# Patient Record
Sex: FEMALE | Race: WHITE | NOT HISPANIC OR LATINO
[De-identification: names, ages, dates, MRNs, and addresses within clinical notes are randomized per-mention and may not be internally consistent; named-entity substitution may affect disease eponyms.]

---

## 2024-10-04 PROBLEM — Z78.9 OTHER SPECIFIED HEALTH STATUS: Chronic | Status: ACTIVE | Noted: 2024-10-03

## 2024-10-10 ENCOUNTER — APPOINTMENT (OUTPATIENT)
Dept: ANTEPARTUM | Facility: CLINIC | Age: 39
End: 2024-10-10
Payer: COMMERCIAL

## 2024-10-10 ENCOUNTER — ASOB RESULT (OUTPATIENT)
Age: 39
End: 2024-10-10

## 2024-10-10 PROCEDURE — 76819 FETAL BIOPHYS PROFIL W/O NST: CPT

## 2024-10-10 PROCEDURE — 76815 OB US LIMITED FETUS(S): CPT

## 2024-10-10 PROCEDURE — 76820 UMBILICAL ARTERY ECHO: CPT

## 2024-10-15 ENCOUNTER — INPATIENT (INPATIENT)
Facility: HOSPITAL | Age: 39
LOS: 2 days | Discharge: HOME | End: 2024-10-18
Attending: OBSTETRICS & GYNECOLOGY | Admitting: OBSTETRICS & GYNECOLOGY
Payer: COMMERCIAL

## 2024-10-15 VITALS
TEMPERATURE: 98 F | HEART RATE: 100 BPM | RESPIRATION RATE: 17 BRPM | DIASTOLIC BLOOD PRESSURE: 80 MMHG | SYSTOLIC BLOOD PRESSURE: 121 MMHG

## 2024-10-15 DIAGNOSIS — O26.899 OTHER SPECIFIED PREGNANCY RELATED CONDITIONS, UNSPECIFIED TRIMESTER: ICD-10-CM

## 2024-10-15 LAB
ALBUMIN SERPL ELPH-MCNC: 3.9 G/DL — SIGNIFICANT CHANGE UP (ref 3.3–5)
ALP SERPL-CCNC: 206 U/L — HIGH (ref 40–120)
ALT FLD-CCNC: 18 U/L — SIGNIFICANT CHANGE UP (ref 10–45)
AMNISURE ROM (RUPTURE OF MEMBRANES): POSITIVE
ANION GAP SERPL CALC-SCNC: 12 MMOL/L — SIGNIFICANT CHANGE UP (ref 5–17)
APTT BLD: 28.5 SEC — SIGNIFICANT CHANGE UP (ref 24.5–35.6)
AST SERPL-CCNC: 24 U/L — SIGNIFICANT CHANGE UP (ref 10–40)
BASOPHILS # BLD AUTO: 0.04 K/UL — SIGNIFICANT CHANGE UP (ref 0–0.2)
BASOPHILS NFR BLD AUTO: 0.3 % — SIGNIFICANT CHANGE UP (ref 0–2)
BILIRUB SERPL-MCNC: 0.6 MG/DL — SIGNIFICANT CHANGE UP (ref 0.2–1.2)
BLD GP AB SCN SERPL QL: NEGATIVE — SIGNIFICANT CHANGE UP
BUN SERPL-MCNC: 8 MG/DL — SIGNIFICANT CHANGE UP (ref 7–23)
CALCIUM SERPL-MCNC: 9.2 MG/DL — SIGNIFICANT CHANGE UP (ref 8.4–10.5)
CHLORIDE SERPL-SCNC: 104 MMOL/L — SIGNIFICANT CHANGE UP (ref 96–108)
CO2 SERPL-SCNC: 21 MMOL/L — LOW (ref 22–31)
CREAT SERPL-MCNC: 0.71 MG/DL — SIGNIFICANT CHANGE UP (ref 0.5–1.3)
EGFR: 112 ML/MIN/1.73M2 — SIGNIFICANT CHANGE UP
EOSINOPHIL # BLD AUTO: 0.08 K/UL — SIGNIFICANT CHANGE UP (ref 0–0.5)
EOSINOPHIL NFR BLD AUTO: 0.7 % — SIGNIFICANT CHANGE UP (ref 0–6)
FIBRINOGEN PPP-MCNC: 550 MG/DL — HIGH (ref 200–445)
GLUCOSE SERPL-MCNC: 55 MG/DL — LOW (ref 70–99)
HCT VFR BLD CALC: 39.3 % — SIGNIFICANT CHANGE UP (ref 34.5–45)
HGB BLD-MCNC: 14 G/DL — SIGNIFICANT CHANGE UP (ref 11.5–15.5)
IMM GRANULOCYTES NFR BLD AUTO: 0.5 % — SIGNIFICANT CHANGE UP (ref 0–0.9)
INR BLD: 0.83 — LOW (ref 0.85–1.16)
LDH SERPL L TO P-CCNC: 201 U/L — SIGNIFICANT CHANGE UP (ref 50–242)
LYMPHOCYTES # BLD AUTO: 1.88 K/UL — SIGNIFICANT CHANGE UP (ref 1–3.3)
LYMPHOCYTES # BLD AUTO: 15.6 % — SIGNIFICANT CHANGE UP (ref 13–44)
MCHC RBC-ENTMCNC: 34.1 PG — HIGH (ref 27–34)
MCHC RBC-ENTMCNC: 35.6 GM/DL — SIGNIFICANT CHANGE UP (ref 32–36)
MCV RBC AUTO: 95.9 FL — SIGNIFICANT CHANGE UP (ref 80–100)
MONOCYTES # BLD AUTO: 0.79 K/UL — SIGNIFICANT CHANGE UP (ref 0–0.9)
MONOCYTES NFR BLD AUTO: 6.6 % — SIGNIFICANT CHANGE UP (ref 2–14)
NEUTROPHILS # BLD AUTO: 9.17 K/UL — HIGH (ref 1.8–7.4)
NEUTROPHILS NFR BLD AUTO: 76.3 % — SIGNIFICANT CHANGE UP (ref 43–77)
NRBC # BLD: 0 /100 WBCS — SIGNIFICANT CHANGE UP (ref 0–0)
PLATELET # BLD AUTO: 253 K/UL — SIGNIFICANT CHANGE UP (ref 150–400)
POTASSIUM SERPL-MCNC: 3.7 MMOL/L — SIGNIFICANT CHANGE UP (ref 3.5–5.3)
POTASSIUM SERPL-SCNC: 3.7 MMOL/L — SIGNIFICANT CHANGE UP (ref 3.5–5.3)
PROT SERPL-MCNC: 6.8 G/DL — SIGNIFICANT CHANGE UP (ref 6–8.3)
PROTHROM AB SERPL-ACNC: 9.6 SEC — LOW (ref 9.9–13.4)
RBC # BLD: 4.1 M/UL — SIGNIFICANT CHANGE UP (ref 3.8–5.2)
RBC # FLD: 13 % — SIGNIFICANT CHANGE UP (ref 10.3–14.5)
RH IG SCN BLD-IMP: POSITIVE — SIGNIFICANT CHANGE UP
RH IG SCN BLD-IMP: POSITIVE — SIGNIFICANT CHANGE UP
SODIUM SERPL-SCNC: 137 MMOL/L — SIGNIFICANT CHANGE UP (ref 135–145)
URATE SERPL-MCNC: 4.8 MG/DL — SIGNIFICANT CHANGE UP (ref 2.5–7)
WBC # BLD: 12.02 K/UL — HIGH (ref 3.8–10.5)
WBC # FLD AUTO: 12.02 K/UL — HIGH (ref 3.8–10.5)

## 2024-10-15 RX ORDER — SODIUM CITRATE AND CITRIC ACID MONOHYDRATE 334; 500 MG/5ML; MG/5ML
15 SOLUTION ORAL EVERY 6 HOURS
Refills: 0 | Status: DISCONTINUED | OUTPATIENT
Start: 2024-10-15 | End: 2024-10-16

## 2024-10-15 RX ORDER — FAMOTIDINE 40 MG
20 TABLET ORAL ONCE
Refills: 0 | Status: COMPLETED | OUTPATIENT
Start: 2024-10-15 | End: 2024-10-15

## 2024-10-15 RX ORDER — SODIUM CHLORIDE IRRIG SOLUTION 0.9 %
1000 SOLUTION, IRRIGATION IRRIGATION
Refills: 0 | Status: DISCONTINUED | OUTPATIENT
Start: 2024-10-15 | End: 2024-10-16

## 2024-10-15 RX ORDER — DINOPROSTONE 10 MG/241MG
10 INSERT VAGINAL ONCE
Refills: 0 | Status: COMPLETED | OUTPATIENT
Start: 2024-10-15 | End: 2024-10-15

## 2024-10-15 RX ORDER — CHLORHEXIDINE GLUCONATE ORAL RINSE 1.2 MG/ML
1 SOLUTION DENTAL DAILY
Refills: 0 | Status: DISCONTINUED | OUTPATIENT
Start: 2024-10-15 | End: 2024-10-16

## 2024-10-15 RX ORDER — PANTOPRAZOLE SODIUM 40 MG/1
40 TABLET, DELAYED RELEASE ORAL DAILY
Refills: 0 | Status: DISCONTINUED | OUTPATIENT
Start: 2024-10-15 | End: 2024-10-18

## 2024-10-15 RX ORDER — HEPARIN SOD,PORK IN 0.45% NACL 5K/1000 ML
250 INTRAVENOUS SOLUTION INTRAVENOUS
Refills: 0 | Status: DISCONTINUED | OUTPATIENT
Start: 2024-10-15 | End: 2024-10-15

## 2024-10-15 RX ORDER — OXYTOCIN/RINGER'S LACTATE 20/500ML
167 PLASTIC BAG, INJECTION (ML) INTRAVENOUS
Qty: 30 | Refills: 0 | Status: DISCONTINUED | OUTPATIENT
Start: 2024-10-15 | End: 2024-10-16

## 2024-10-15 RX ADMIN — Medication 250 MILLILITER(S): at 20:01

## 2024-10-15 RX ADMIN — PANTOPRAZOLE SODIUM 40 MILLIGRAM(S): 40 TABLET, DELAYED RELEASE ORAL at 17:22

## 2024-10-15 RX ADMIN — Medication 125 MILLILITER(S): at 18:48

## 2024-10-15 NOTE — OB PROVIDER LABOR PROGRESS NOTE - NS_SUBJECTIVE/OBJECTIVE_OBGYN_ALL_OB_FT
The patient had an episode of bloody emesis.   About 10-15 cc of red blood.   She drank a shake earlier, no red fruits in it. This is the first time that she is having bloody emesis.  She is endorsing multiple episodes of vomiting during pregnancy, last one was 3 weeks ago. She also endorses severe heartburn during pregnancy.   She currently denies pain (except of contraction pain)  Full labs drawn   Hb 14.0  Will continue to monitor closely.

## 2024-10-15 NOTE — OB PROVIDER LABOR PROGRESS NOTE - NS_SUBJECTIVE/OBJECTIVE_OBGYN_ALL_OB_FT
Pt seen at bedside. Had episode of hematoemesis. Reports severe heart burn during pregnancy. No pain or nausea now. Denies light headedness

## 2024-10-15 NOTE — OB PROVIDER LABOR PROGRESS NOTE - NS_SUBJECTIVE/OBJECTIVE_OBGYN_ALL_OB_FT
Night team assuming care at this time. EFM and labor plan reviewed. Last VE 4/70/-2. Patient repositioning for epidural.

## 2024-10-15 NOTE — PRE-ANESTHESIA EVALUATION ADULT - NSANTHPEFT_GEN_ALL_CORE
Constitutional: Alert and in no acute distress.  Mouth: Mouth opening within normal limits.   Neck: The appearance of the neck was normal, no neck mass was observed. Thyromental distance within normal limits. Range of motion of neck is within normal limits.  Respiratory: Unlabored breathing.  Cardiovascular:  Rate and rhythm evaluated.  Neurological: No focal deficit, moves all extremities.  Psychiatric: Oriented to person, place, and time, insight and judgment were intact and the affect was normal.  Exercise Tolerance: MET>4.

## 2024-10-15 NOTE — OB RN PATIENT PROFILE - AS SC BRADEN MOBILITY
(4) no limitation

## 2024-10-15 NOTE — OB PROVIDER LABOR PROGRESS NOTE - NS_OBIHIFHRDETAILS_OBGYN_ALL_OB_FT
FHT Cat 2, FHR bpm, moderate variability, + accels, + prolonged 4 minute post-epidural deceleration, improved with interventions described above. Moderate variability overall reassuring. FHT Cat 2,  bpm, moderate variability, + accels, + prolonged 4 minute post-epidural deceleration, improved with interventions described above. Moderate variability overall reassuring.

## 2024-10-15 NOTE — OB PROVIDER LABOR PROGRESS NOTE - NS_OBIHICONTRACTIONPATTERNDETAILS_OBGYN_ALL_OB_FT
contractions q 2-5min
Angelica q2-3 minutes on toco.
ctx q 2-3 min
Angelica q2 minutes on toco.
ctx q2 minutes
regular q3 min

## 2024-10-15 NOTE — OB PROVIDER H&P - HISTORY OF PRESENT ILLNESS
39y  at 39+4 presenting for wetness in her underwear. Yesterday she started to have mild irregular ctx 2/10 pain and she passed her mucus plag. She woke up with her underwear wet, she describes it as thick discharge, clear, she is no leaking since then.   +FM. Denies VB    Ante: natural pregnancy, NIPT and anatomy scan wnl, passed GCT  EFW 3600g GBS negative  Denies elevated BPs in pregnancy    ObHx: G1 - current  GynHx: abnormal paps in the past, most recent one wnl  PMH: denies  PSH: deneis  Meds: PNV  Allergy: PCN and sulfa - hives    T(C): 36.5 (10-15-24 @ 11:14), Max: 36.5 (10-15-24 @ 11:14)  HR: 100 (10-15-24 @ 11:14) (100 - 100)  BP: 121/80 (10-15-24 @ 11:14) (121/80 - 121/80)  RR: 17 (10-15-24 @ 11:14) (17 - 17)  SpO2: --    PE:   NAD, A+O*3  Comfortable on RA  Abdomen gravid soft nontender  Extremities no edema/calf tenderness  VE: FT/long  TAUS: Cephalic, anterior placenta, BPP 8/8, SANTIAGO 10  FHT: baseline 130bpm, moderate variability, accels, no decels  [place]: irregular ctx, uterine irritability  Spec: cervix visualized closed, no pooling with valsalva, nitrazine negative  Amnisure taken  Ferning negative    39y  at 39+4 presenting for r/o rupture of membranes, amnisure is positive, patient will be admitted for term induction.     D/w Dr. Haro

## 2024-10-15 NOTE — OB RN PATIENT PROFILE - PRO MENTAL HEALTH SX RECENT
none

## 2024-10-15 NOTE — OB PROVIDER TRIAGE NOTE - HISTORY OF PRESENT ILLNESS
39y  at 39+4 presenting for wetness in her underwear. Yesterday she started to have mild irregular ctx 2/10 pain and she passed her mucus plag. She woke up with her underwear wet, she describes it as thick discharge, clear, she is no leaking since then.   +FM. Denies VB    Ante: natural pregnancy, NIPT and anatomy scan wnl, passed GCT  EFW 3600g GBS negative  Denies elevated BPs in pregnancy    ObHx: G1 - current  GynHx: abnormal paps in the past, most recent one wnl  PMH: denies  PSH: deneis  Meds: PNV  Allergy: PCN and sulfa - hives    T(C): 36.5 (10-15-24 @ 11:14), Max: 36.5 (10-15-24 @ 11:14)  HR: 100 (10-15-24 @ 11:14) (100 - 100)  BP: 121/80 (10-15-24 @ 11:14) (121/80 - 121/80)  RR: 17 (10-15-24 @ 11:14) (17 - 17)  SpO2: --    PE:   NAD, A+O*3  Comfortable on RA  Abdomen gravid soft nontender  Extremities no edema/calf tenderness  VE:   TAUS:   FHT: baseline  [place]:       39y yo at    presenting for  - Consented  - NPO and IVF  - Prenatals reviewed, f/u routine labs  - FHT reactive and reassuring   39y  at 39+4 presenting for wetness in her underwear. Yesterday she started to have mild irregular ctx 2/10 pain and she passed her mucus plag. She woke up with her underwear wet, she describes it as thick discharge, clear, she is no leaking since then.   +FM. Denies VB    Ante: natural pregnancy, NIPT and anatomy scan wnl, passed GCT  EFW 3600g GBS negative  Denies elevated BPs in pregnancy    ObHx: G1 - current  GynHx: abnormal paps in the past, most recent one wnl  PMH: denies  PSH: deneis  Meds: PNV  Allergy: PCN and sulfa - hives    T(C): 36.5 (10-15-24 @ 11:14), Max: 36.5 (10-15-24 @ 11:14)  HR: 100 (10-15-24 @ 11:14) (100 - 100)  BP: 121/80 (10-15-24 @ 11:14) (121/80 - 121/80)  RR: 17 (10-15-24 @ 11:14) (17 - 17)  SpO2: --    PE:   NAD, A+O*3  Comfortable on RA  Abdomen gravid soft nontender  Extremities no edema/calf tenderness  VE: FT/long  TAUS: Cephalic, anterior placenta, BPP 8/8, SANTIAGO 10  FHT: baseline 130bpm, moderate variability, accels, no decels  [place]: irregular ctx, uterine irritability  Spec: cervix visualized closed, no pooling with valsalva, nitrazine negative  Amnisure taken  Ferning negative    39y  at 39+4 presenting for r/o rupture of membranes  - Patient had one episode of wetness which she is describes " like when you stand up after having sex and you have a little gush", she is not leaking since  - speculum exam is negative for pooling with valsalva   - Nitrazine negative  - Ferning negative  - SANTIAGO 10  Low concern for rupture. Fetal status is reassuring both on NST and BPP, patient is not painfuly laurent and is not in labor, cervix is closed and long.  The patient is scheduled for an induction on 10/17, she received strict return percations including VB/LOF, painful regular ctx, decreased fetal movements.   D/w Dr. Haro   39y  at 39+4 presenting for wetness in her underwear. Yesterday she started to have mild irregular ctx 2/10 pain and she passed her mucus plag. She woke up with her underwear wet, she describes it as thick discharge, clear, she is no leaking since then.   +FM. Denies VB    Ante: natural pregnancy, NIPT and anatomy scan wnl, passed GCT  EFW 3600g GBS negative  Denies elevated BPs in pregnancy    ObHx: G1 - current  GynHx: abnormal paps in the past, most recent one wnl  PMH: denies  PSH: deneis  Meds: PNV  Allergy: PCN and sulfa - hives    T(C): 36.5 (10-15-24 @ 11:14), Max: 36.5 (10-15-24 @ 11:14)  HR: 100 (10-15-24 @ 11:14) (100 - 100)  BP: 121/80 (10-15-24 @ 11:14) (121/80 - 121/80)  RR: 17 (10-15-24 @ 11:14) (17 - 17)  SpO2: --    PE:   NAD, A+O*3  Comfortable on RA  Abdomen gravid soft nontender  Extremities no edema/calf tenderness  VE: FT/long  TAUS: Cephalic, anterior placenta, BPP 8/8, SANTIAGO 10  FHT: baseline 130bpm, moderate variability, accels, no decels  [place]: irregular ctx, uterine irritability  Spec: cervix visualized closed, no pooling with valsalva, nitrazine negative  Amnisure taken  Ferning negative    39y  at 39+4 presenting for r/o rupture of membranes, amnisure is positive, patient will be admitted for term induction.     D/w Dr. Haro

## 2024-10-15 NOTE — OB PROVIDER LABOR PROGRESS NOTE - ASSESSMENT
38yo P1 now in second stage of labor, AFVSS, plan to start pushing.
Cervical santa balloon in situ. Continue to monitor contractions.
IOL for suspected prom, suspected lga, cat 1 tracing  will check cbc,coags  pantoprazole ordered  plan to start pitocin
- Epidural in situ  - Continue expectant management  - Will continue to monitor    Dr. Lanrdum on
- Plan for epidural placement then SVE  - Will continue to monitor
Continue to monitor  Tracing Cat I

## 2024-10-15 NOTE — OB PROVIDER H&P - NSLOWPPHRISK_OBGYN_A_OB
No previous uterine incision/[place] Pregnancy/Less than or equal to 4 previous vaginal births/No known bleeding disorder/No history of postpartum hemorrhage/No other PPH risks indicated

## 2024-10-15 NOTE — OB PROVIDER H&P - BLOOD TRANSFUSION, PREVIOUS, PROFILE
Attempted to call patient - voicemail not set up at this time    Nothing further, her mammogram was good just noting that her breast tissue is dense.    no

## 2024-10-15 NOTE — PRE-ANESTHESIA EVALUATION ADULT - NSANTHOSAYNRD_GEN_A_CORE
No. MONTSE screening performed.  STOP BANG Legend: 0-2 = LOW Risk; 3-4 = INTERMEDIATE Risk; 5-8 = HIGH Risk

## 2024-10-15 NOTE — OB PROVIDER LABOR PROGRESS NOTE - NS_SUBJECTIVE/OBJECTIVE_OBGYN_ALL_OB_FT
EFM reviewed  Patient seen at the bedside as reporting sensation that balloon has come out  Balloon out  Clear amniotic fluid noted on exam  SVE: 4/70/-2

## 2024-10-15 NOTE — OB PROVIDER LABOR PROGRESS NOTE - NS_SUBJECTIVE/OBJECTIVE_OBGYN_ALL_OB_FT
Cook balloon placed at 15:45.   VE: 1/80/-2  Patient is laurent every 2-3 min  FHT: baseline 140  bpm, moderate variability, +accels, -decels cat1  [place]: 2-3

## 2024-10-15 NOTE — OB PROVIDER LABOR PROGRESS NOTE - NS_SUBJECTIVE/OBJECTIVE_OBGYN_ALL_OB_FT
Note delayed 2/2 patient care. Patient seen a bedside for a 4 minute late deceleration with a shikha of 70 bpm after epidural placement. Patient repositioned to her left and right side, IV fluids bolused, BP 76/37, and ephedrine was given by anesthesia with a return of the FHR to 120 bpm. Repeat /35 and 128/78.  Patient also vomiting at this time with continued hematemesis, now "coffee ground", not bright red, improved with normotensive BP. SVE 5/80/-2. Note delayed 2/2 patient care. Patient seen a bedside for a 4 minute late deceleration with a shikha of 70 bpm after epidural placement. Patient repositioned to her left and right side, IV fluids bolused, BP 76/37, and ephedrine was given by anesthesia with a return of the FHR to 120 bpm, then to 130 bpm. Repeat /35 and 128/78. Patient also vomiting at this time with continued hematemesis, now "coffee ground", not bright red, nausea improved with normotensive BP. SVE 5/80/-2.

## 2024-10-15 NOTE — OB PROVIDER LABOR PROGRESS NOTE - NS_SUBJECTIVE/OBJECTIVE_OBGYN_ALL_OB_FT
120 mod tierney +Accel -decel   ctx q3 min   cat I reassuring fetal a/b status  cw expectant management  reexamine around midnight

## 2024-10-16 LAB
HCT VFR BLD CALC: 31.4 % — LOW (ref 34.5–45)
HGB BLD-MCNC: 10.8 G/DL — LOW (ref 11.5–15.5)
MCHC RBC-ENTMCNC: 33.8 PG — SIGNIFICANT CHANGE UP (ref 27–34)
MCHC RBC-ENTMCNC: 34.4 GM/DL — SIGNIFICANT CHANGE UP (ref 32–36)
MCV RBC AUTO: 98.1 FL — SIGNIFICANT CHANGE UP (ref 80–100)
NRBC # BLD: 0 /100 WBCS — SIGNIFICANT CHANGE UP (ref 0–0)
PLATELET # BLD AUTO: 213 K/UL — SIGNIFICANT CHANGE UP (ref 150–400)
RBC # BLD: 3.2 M/UL — LOW (ref 3.8–5.2)
RBC # FLD: 13 % — SIGNIFICANT CHANGE UP (ref 10.3–14.5)
T PALLIDUM AB TITR SER: NEGATIVE — SIGNIFICANT CHANGE UP
WBC # BLD: 18.15 K/UL — HIGH (ref 3.8–10.5)
WBC # FLD AUTO: 18.15 K/UL — HIGH (ref 3.8–10.5)

## 2024-10-16 PROCEDURE — 99222 1ST HOSP IP/OBS MODERATE 55: CPT | Mod: GC

## 2024-10-16 RX ORDER — ANTI-ITCH CREAM 1 G/100G
1 OINTMENT TOPICAL EVERY 6 HOURS
Refills: 0 | Status: DISCONTINUED | OUTPATIENT
Start: 2024-10-16 | End: 2024-10-18

## 2024-10-16 RX ORDER — KETOROLAC TROMETHAMINE 10 MG/1
30 TABLET, FILM COATED ORAL ONCE
Refills: 0 | Status: DISCONTINUED | OUTPATIENT
Start: 2024-10-16 | End: 2024-10-16

## 2024-10-16 RX ORDER — PRENATAL VIT,CAL 76/IRON/FOLIC 29 MG-1 MG
1 TABLET ORAL DAILY
Refills: 0 | Status: DISCONTINUED | OUTPATIENT
Start: 2024-10-16 | End: 2024-10-18

## 2024-10-16 RX ORDER — SODIUM CHLORIDE 0.9 % (FLUSH) 0.9 %
3 SYRINGE (ML) INJECTION EVERY 8 HOURS
Refills: 0 | Status: DISCONTINUED | OUTPATIENT
Start: 2024-10-16 | End: 2024-10-18

## 2024-10-16 RX ORDER — OXYCODONE HYDROCHLORIDE 30 MG/1
5 TABLET, FILM COATED, EXTENDED RELEASE ORAL
Refills: 0 | Status: DISCONTINUED | OUTPATIENT
Start: 2024-10-16 | End: 2024-10-18

## 2024-10-16 RX ORDER — TETANUS TOXOID, REDUCED DIPHTHERIA TOXOID AND ACELLULAR PERTUSSIS VACCINE, ADSORBED 5; 2.5; 8; 8; 2.5 [IU]/.5ML; [IU]/.5ML; UG/.5ML; UG/.5ML; UG/.5ML
0.5 SUSPENSION INTRAMUSCULAR ONCE
Refills: 0 | Status: DISCONTINUED | OUTPATIENT
Start: 2024-10-16 | End: 2024-10-18

## 2024-10-16 RX ORDER — DIBUCAINE 1 %
1 OINTMENT (GRAM) TOPICAL EVERY 6 HOURS
Refills: 0 | Status: DISCONTINUED | OUTPATIENT
Start: 2024-10-16 | End: 2024-10-18

## 2024-10-16 RX ORDER — SOAP/LANOLIN
1 BAR TOPICAL EVERY 4 HOURS
Refills: 0 | Status: DISCONTINUED | OUTPATIENT
Start: 2024-10-16 | End: 2024-10-18

## 2024-10-16 RX ORDER — PRAMOXINE HYDROCHLORIDE 10 MG/ML
1 LOTION TOPICAL EVERY 4 HOURS
Refills: 0 | Status: DISCONTINUED | OUTPATIENT
Start: 2024-10-16 | End: 2024-10-18

## 2024-10-16 RX ORDER — PANTOPRAZOLE SODIUM 40 MG/1
40 TABLET, DELAYED RELEASE ORAL
Refills: 0 | Status: DISCONTINUED | OUTPATIENT
Start: 2024-10-16 | End: 2024-10-16

## 2024-10-16 RX ORDER — DIPHENHYDRAMINE HCL 12.5MG/5ML
25 LIQUID (ML) ORAL EVERY 6 HOURS
Refills: 0 | Status: DISCONTINUED | OUTPATIENT
Start: 2024-10-16 | End: 2024-10-18

## 2024-10-16 RX ORDER — MAGNESIUM HYDROXIDE 400 MG/5ML
30 SUSPENSION, ORAL (FINAL DOSE FORM) ORAL
Refills: 0 | Status: DISCONTINUED | OUTPATIENT
Start: 2024-10-16 | End: 2024-10-18

## 2024-10-16 RX ORDER — OXYCODONE HYDROCHLORIDE 30 MG/1
5 TABLET, FILM COATED, EXTENDED RELEASE ORAL ONCE
Refills: 0 | Status: DISCONTINUED | OUTPATIENT
Start: 2024-10-16 | End: 2024-10-18

## 2024-10-16 RX ORDER — OXYTOCIN/RINGER'S LACTATE 20/500ML
167 PLASTIC BAG, INJECTION (ML) INTRAVENOUS
Qty: 30 | Refills: 0 | Status: DISCONTINUED | OUTPATIENT
Start: 2024-10-16 | End: 2024-10-18

## 2024-10-16 RX ORDER — ACETAMINOPHEN 325 MG
975 TABLET ORAL
Refills: 0 | Status: DISCONTINUED | OUTPATIENT
Start: 2024-10-16 | End: 2024-10-18

## 2024-10-16 RX ADMIN — Medication 975 MILLIGRAM(S): at 20:30

## 2024-10-16 RX ADMIN — Medication 975 MILLIGRAM(S): at 15:09

## 2024-10-16 RX ADMIN — Medication 600 MILLIGRAM(S): at 06:50

## 2024-10-16 RX ADMIN — KETOROLAC TROMETHAMINE 30 MILLIGRAM(S): 10 TABLET, FILM COATED ORAL at 04:00

## 2024-10-16 RX ADMIN — Medication 1 TABLET(S): at 11:22

## 2024-10-16 RX ADMIN — Medication 975 MILLIGRAM(S): at 20:49

## 2024-10-16 RX ADMIN — Medication 600 MILLIGRAM(S): at 06:40

## 2024-10-16 RX ADMIN — Medication 600 MILLIGRAM(S): at 11:22

## 2024-10-16 RX ADMIN — Medication 3 MILLILITER(S): at 06:50

## 2024-10-16 RX ADMIN — Medication 975 MILLIGRAM(S): at 08:53

## 2024-10-16 RX ADMIN — KETOROLAC TROMETHAMINE 30 MILLIGRAM(S): 10 TABLET, FILM COATED ORAL at 02:30

## 2024-10-16 RX ADMIN — Medication 600 MILLIGRAM(S): at 18:05

## 2024-10-16 NOTE — PROGRESS NOTE ADULT - ASSESSMENT
40yo P1 PPD0 s/p  c/b 3a laceration, doing well this morning. In setting of episode of hematemesis during labor, will f/u am cbc to evaluate for ongoing bleeding and consult GI to expedite outpatient follow up. No further episodes of hematemesis since delivery and no ongoing symptoms of reflux. AFVSS. Discussed office appointments at 2w and 6w postpartum. Reviewed bleeding and infection precautions, all questions answered.     Regular diet  DVT ppx: ambulating  Continue tylenol/ibuprofen for pain control, dilaudid prn  Pepcid prn  Routine perineal care, icing, stool softener   No circumcision  Discharge home PPD1/2

## 2024-10-16 NOTE — LACTATION INITIAL EVALUATION - LACTATION INTERVENTIONS
initiate/review safe skin-to-skin/initiate/review techniques for position and latch/post discharge community resources provided/initiate/review breast massage/compression/reviewed components of an effective feeding and at least 8 effective feedings per day required/reviewed importance of monitoring infant diapers, the breastfeeding log, and minimum output each day/reviewed feeding on demand/by cue at least 8 times a day

## 2024-10-16 NOTE — LACTATION INITIAL EVALUATION - NS LACT CON REASON FOR REQ
Minerva Romero is a 39 year old  parent of a 39.4 week infant. LC to bedside to discuss progress of breastfeeding and provide support on PPD0. Patient states that she plans to breastfeed. She denies any chronic medical conditions or history of breast/chest surgery. On assessment, breasts are pendulous, soft, symmetric. Nipples intact and everted, +colostrum. LC assisted with hand placement in the football position. Baby latched with wide gape, flanged lips, and bursts of nutritive suckling. Patient endorses a comfortable latch.   PLAN: Provide as much skin to skin as safely able, continue nursing on demand at least Q2-3h, monitor diaper output, follow up with pediatrician, outpatient IBCLC PRN.   EDUCATION:  *Normal  feeding/behavior patterns   *Infant feeding cues and strategies to feed a sleepy baby   *Stimulate breasts & feed baby Q2-3h, keys to an adequate supply   *Properties of a proper latch and positioning to achieve a deep and effective latch  *Breastfeeding positions   *Signs of milk transfer at the breast   *Benefits/methods for hand expression   *Signs of satiety & adequate  intake/output such as wet/dirty diapers and weight.   Patient verbalized understanding of education and how/when to follow up. All questions answered at this time, and LC available via RN.

## 2024-10-16 NOTE — OB PROVIDER DELIVERY SUMMARY - NSPROVIDERDELIVERYNOTE_OBGYN_ALL_OB_FT
Uncomplicated  of vigorous male . Delayed cord clamping. 3A laceration repaired with vicryl and chromic. Pediatrician present at delivery for cat 2 tracing. Patient and  doing well.

## 2024-10-16 NOTE — CONSULT NOTE ADULT - SUBJECTIVE AND OBJECTIVE BOX
GASTROENTEROLOGY CONSULT NOTE  HPI: 39y  at 39+4 presenting for wetness in her underwear. Yesterday she started to have mild irregular ctx 2/10 pain and she passed her mucus plag. She woke up with her underwear wet, she describes it as thick discharge, clear, she is no leaking since then. S/p vaginal delivery on 10/16/24 and GI was consulted for an episode of hematemesis:    Patient had frequent GERD symptoms during pregnancy but managed w/out medications. During labor she had a few episodes of vomiting w/ the last episode of emesis w/ bright red blood mixed in. No further episodes of vomiting since delivery. GERD symptoms also resolved since delivery.     Allergies    sulfa drugs (Rash)  penicillin (Rash)    Intolerances      Home Medications:    MEDICATIONS:  MEDICATIONS  (STANDING):  acetaminophen     Tablet .. 975 milliGRAM(s) Oral <User Schedule>  diphtheria/tetanus/pertussis (acellular) Vaccine (Adacel) 0.5 milliLiter(s) IntraMuscular once  famotidine Injectable 20 milliGRAM(s) IV Push once  fentanyl (2 MICROgram(s)/mL) + bupivacaine 0.0625%  in 0.9% Sodium Chloride PCEA 250 milliLiter(s) Epidural PCA Continuous  ibuprofen  Tablet. 600 milliGRAM(s) Oral every 6 hours  oxytocin Infusion 167 milliUNIT(s)/Min (167 mL/Hr) IV Continuous <Continuous>  pantoprazole  Injectable 40 milliGRAM(s) IV Push daily  polyethylene glycol 3350 17 Gram(s) Oral daily  prenatal multivitamin 1 Tablet(s) Oral daily  sodium chloride 0.9% lock flush 3 milliLiter(s) IV Push every 8 hours    MEDICATIONS  (PRN):  benzocaine 20%/menthol 0.5% Spray 1 Spray(s) Topical every 6 hours PRN for Perineal discomfort  dibucaine 1% Ointment 1 Application(s) Topical every 6 hours PRN Perineal discomfort  diphenhydrAMINE 25 milliGRAM(s) Oral every 6 hours PRN Pruritus  hydrocortisone 1% Cream 1 Application(s) Topical every 6 hours PRN Moderate Pain (4-6)  lanolin Ointment 1 Application(s) Topical every 6 hours PRN nipple soreness  magnesium hydroxide Suspension 30 milliLiter(s) Oral two times a day PRN Constipation  oxyCODONE    IR 5 milliGRAM(s) Oral every 3 hours PRN Moderate to Severe Pain (4-10)  oxyCODONE    IR 5 milliGRAM(s) Oral once PRN Moderate to Severe Pain (4-10)  pramoxine 1%/zinc 5% Cream 1 Application(s) Topical every 4 hours PRN Moderate Pain (4-6)  simethicone 80 milliGRAM(s) Chew every 4 hours PRN Gas  witch hazel Pads 1 Application(s) Topical every 4 hours PRN Perineal discomfort    PAST MEDICAL & SURGICAL HISTORY:  No pertinent past medical history      No significant past surgical history        FAMILY HISTORY:    SOCIAL HISTORY:  Tobacco: [ ] Current, [ ] Former, [ ] Never; Pack Years:  Alcohol:  Illicit Drugs:    REVIEW OF SYSTEMS:  All other 10 review of systems is negative unless indicated above.    Vital Signs Last 24 Hrs  T(C): 36.9 (16 Oct 2024 14:01), Max: 37.1 (16 Oct 2024 04:11)  T(F): 98.4 (16 Oct 2024 14:), Max: 98.7 (16 Oct 2024 04:11)  HR: 65 (16 Oct 2024 14:01) (63 - 97)  BP: 108/66 (16 Oct 2024 14:01) (103/65 - 135/70)  BP(mean): --  RR: 18 (16 Oct 2024 14:) (16 - 18)  SpO2: 96% (16 Oct 2024 14:) (96% - 100%)    Parameters below as of 16 Oct 2024 14:01  Patient On (Oxygen Delivery Method): room air        10-15 @ 07:01  -  10-16 @ 07:00  --------------------------------------------------------  IN: 3500 mL / OUT: 2400 mL / NET: 1100 mL        PHYSICAL EXAM:    General: lying in bed, in no acute distress  HEENT: Neck supple, mmm, no jvd  Lungs: Normal respiratory effort, no intercostal retractions  Cardiovascular: regular rate  Abdomen: Soft, non-tender non-distended; No rebound or guarding  Neurological: CASSIDY, speech fluent  Skin: Warm and dry. No obvious rash    LABS:                        10.8   18.15 )-----------( 213      ( 16 Oct 2024 11:50 )             31.4     10-15    137  |  104  |  8   ----------------------------<  55[L]  3.7   |  21[L]  |  0.71    Ca    9.2      15 Oct 2024 16:35    TPro  6.8  /  Alb  3.9  /  TBili  0.6  /  DBili  x   /  AST  24  /  ALT  18  /  AlkPhos  206[H]  10-15        PT/INR - ( 15 Oct 2024 16:35 )   PT: 9.6 sec;   INR: 0.83          PTT - ( 15 Oct 2024 16:35 )  PTT:28.5 sec    RADIOLOGY & ADDITIONAL STUDIES:     Reviewed

## 2024-10-16 NOTE — OB RN DELIVERY SUMMARY - NSSELHIDDEN_OBGYN_ALL_OB_FT
[NS_DeliveryAttending1_OBGYN_ALL_OB_FT:[id number]==],[NS_DeliveryRN_OBGYN_ALL_OB_FT:[id number]=]

## 2024-10-16 NOTE — OB NEONATOLOGY/PEDIATRICIAN DELIVERY SUMMARY - NSPEDSNEONOTESA_OBGYN_ALL_OB_FT
NICU called to delivery for cat II. Baby emerged with good tone, crying, vigorous. DCC x 30 secs. Infant placed on open warmer, dried, suctioned, stimulated. PE notable for caput otherwise WNL. Cleared for WBN, at this time.

## 2024-10-16 NOTE — CONSULT NOTE ADULT - ASSESSMENT
38 yo female s/p  who had one episode of hematemesis during labor for which GI was consulted:    #Hematemesis  - One episode of hematemesis during labor following episode of vomiting. Also w/ frequent reflux symptoms during pregnancy which are now resolved. Hematemesis most likely 2/2 Nell Esteban tear vs from esophagitis  - No plan for endoscopic evaluation at this time  - Trend CBC, maintain active T&S  - Omeprazole 40 mg daily for 2 weeks  - Outpatient GI f/u  - GI will sign off at this time. Please re-consult if patient's clinical status changes or you have further questions    Case discussed w/ GI attending Dr. Kamaljit Johnson MD  PGY-4 GI Fellow  GI consult pager ([id number]): [phone]  Please call  for on-call fellow after hours

## 2024-10-16 NOTE — OB RN DELIVERY SUMMARY - NS_RNDELIVATTEST_OBGYN_ALL_OB
0 = swallows foods/liquids without difficulty OB Provider reported that the vagina was inspected and no foreign body was found/Laps, needles and instrument count was correct

## 2024-10-16 NOTE — OB RN DELIVERY SUMMARY - NS_SEPSISRSKCALC_OBGYN_ALL_OB_FT
EOS calculated successfully. EOS Risk Factor: 0.5/1000 live births ([facility] national incidence); GA=39w6d; Temp=98.1; ROM=20.983; GBS='Negative'; Antibiotics='No antibiotics or any antibiotics < 2 hrs prior to birth'

## 2024-10-16 NOTE — CONSULT NOTE ADULT - ATTENDING COMMENTS
Patient seen, examined, and discussed with Dr. Johnson. Agree with above. 40 yo female s/p  who had one episode of hematemesis during labor for which GI was consulted. Patient having reflux symptoms for last few weeks of pregnancy. Most likely 2/2 esophagitis or Nell Esteban tear. Recommended short course PPI trial to assist in mucosal healing. Will sign off, please call back with questions or concerns.     Oliver Mari MD  Gastroenterology

## 2024-10-16 NOTE — CONSULT NOTE ADULT - TIME-BASED BILLING (NON-CRITICAL CARE)
Time-based billing (NON-critical care) Griseofulvin Counseling:  I discussed with the patient the risks of griseofulvin including but not limited to photosensitivity, cytopenia, liver damage, nausea/vomiting and severe allergy.  The patient understands that this medication is best absorbed when taken with a fatty meal (e.g., ice cream or french fries).

## 2024-10-17 LAB
BASOPHILS # BLD AUTO: 0.06 K/UL — SIGNIFICANT CHANGE UP (ref 0–0.2)
BASOPHILS NFR BLD AUTO: 0.4 % — SIGNIFICANT CHANGE UP (ref 0–2)
EOSINOPHIL # BLD AUTO: 0.18 K/UL — SIGNIFICANT CHANGE UP (ref 0–0.5)
EOSINOPHIL NFR BLD AUTO: 1.2 % — SIGNIFICANT CHANGE UP (ref 0–6)
HCT VFR BLD CALC: 30.2 % — LOW (ref 34.5–45)
HGB BLD-MCNC: 10.2 G/DL — LOW (ref 11.5–15.5)
IMM GRANULOCYTES NFR BLD AUTO: 0.6 % — SIGNIFICANT CHANGE UP (ref 0–0.9)
LYMPHOCYTES # BLD AUTO: 1.94 K/UL — SIGNIFICANT CHANGE UP (ref 1–3.3)
LYMPHOCYTES # BLD AUTO: 12.6 % — LOW (ref 13–44)
MCHC RBC-ENTMCNC: 33.8 GM/DL — SIGNIFICANT CHANGE UP (ref 32–36)
MCHC RBC-ENTMCNC: 33.8 PG — SIGNIFICANT CHANGE UP (ref 27–34)
MCV RBC AUTO: 100 FL — SIGNIFICANT CHANGE UP (ref 80–100)
MONOCYTES # BLD AUTO: 1 K/UL — HIGH (ref 0–0.9)
MONOCYTES NFR BLD AUTO: 6.5 % — SIGNIFICANT CHANGE UP (ref 2–14)
NEUTROPHILS # BLD AUTO: 12.13 K/UL — HIGH (ref 1.8–7.4)
NEUTROPHILS NFR BLD AUTO: 78.7 % — HIGH (ref 43–77)
NRBC # BLD: 0 /100 WBCS — SIGNIFICANT CHANGE UP (ref 0–0)
PLATELET # BLD AUTO: 221 K/UL — SIGNIFICANT CHANGE UP (ref 150–400)
RBC # BLD: 3.02 M/UL — LOW (ref 3.8–5.2)
RBC # FLD: 13.5 % — SIGNIFICANT CHANGE UP (ref 10.3–14.5)
WBC # BLD: 15.4 K/UL — HIGH (ref 3.8–10.5)
WBC # FLD AUTO: 15.4 K/UL — HIGH (ref 3.8–10.5)

## 2024-10-17 RX ADMIN — Medication 975 MILLIGRAM(S): at 03:46

## 2024-10-17 RX ADMIN — Medication 17 GRAM(S): at 11:30

## 2024-10-17 RX ADMIN — Medication 975 MILLIGRAM(S): at 03:45

## 2024-10-17 RX ADMIN — Medication 600 MILLIGRAM(S): at 01:18

## 2024-10-17 RX ADMIN — Medication 600 MILLIGRAM(S): at 18:01

## 2024-10-17 RX ADMIN — Medication 600 MILLIGRAM(S): at 06:36

## 2024-10-17 RX ADMIN — Medication 975 MILLIGRAM(S): at 15:45

## 2024-10-17 RX ADMIN — Medication 975 MILLIGRAM(S): at 10:15

## 2024-10-17 RX ADMIN — Medication 600 MILLIGRAM(S): at 06:38

## 2024-10-17 RX ADMIN — Medication 975 MILLIGRAM(S): at 21:33

## 2024-10-17 RX ADMIN — Medication 975 MILLIGRAM(S): at 09:37

## 2024-10-17 RX ADMIN — Medication 600 MILLIGRAM(S): at 00:35

## 2024-10-17 RX ADMIN — Medication 600 MILLIGRAM(S): at 11:31

## 2024-10-17 RX ADMIN — Medication 1 TABLET(S): at 11:31

## 2024-10-17 RX ADMIN — Medication 975 MILLIGRAM(S): at 21:32

## 2024-10-17 NOTE — PROGRESS NOTE ADULT - ASSESSMENT
A/P 39y s/p , PPD#1 stable, meeting postpartum milestones   - Pain: well controlled on tylenol/motrin  - GI: Tolerating regular diet, on omeprazole  - : urinating without difficulty/pain  - DVT prophylaxis: ambulating frequently  - Dispo: PPD 2, unless otherwise specified     A/P 39y s/p , PPD#1 stable, meeting postpartum milestones   #Hematemesis:  - One episode on admission, patient denying any additional episodes of hematemesis, without symptoms of anemia  - Per GI, no plan for endoscopic eval  - C/w omeprazole 40mg qD x2 weeks  - outpatient GI f/u  - f/u AM CBC, maintain active T&S    #Postpartum care:  - Pain: well controlled on tylenol/motrin  - GI: Tolerating regular diet, on omeprazole  - : urinating without difficulty/pain  - DVT prophylaxis: ambulating frequently  - Dispo: PPD 2, unless otherwise specified

## 2024-10-17 NOTE — ANESTHESIA FOLLOW-UP NOTE - NSEVALATION_GEN_ALL_CORE
Routing to PCP    Shawna Musa MD     No apparent complications or complaints regarding anesthesia care at this time

## 2024-10-18 ENCOUNTER — TRANSCRIPTION ENCOUNTER (OUTPATIENT)
Age: 39
End: 2024-10-18

## 2024-10-18 VITALS
SYSTOLIC BLOOD PRESSURE: 105 MMHG | TEMPERATURE: 98 F | DIASTOLIC BLOOD PRESSURE: 69 MMHG | OXYGEN SATURATION: 97 % | HEART RATE: 64 BPM | RESPIRATION RATE: 18 BRPM

## 2024-10-18 LAB
BASOPHILS # BLD AUTO: 0.07 K/UL — SIGNIFICANT CHANGE UP (ref 0–0.2)
BASOPHILS NFR BLD AUTO: 0.6 % — SIGNIFICANT CHANGE UP (ref 0–2)
BLD GP AB SCN SERPL QL: NEGATIVE — SIGNIFICANT CHANGE UP
EOSINOPHIL # BLD AUTO: 0.33 K/UL — SIGNIFICANT CHANGE UP (ref 0–0.5)
EOSINOPHIL NFR BLD AUTO: 2.7 % — SIGNIFICANT CHANGE UP (ref 0–6)
HCT VFR BLD CALC: 33.8 % — LOW (ref 34.5–45)
HGB BLD-MCNC: 11.4 G/DL — LOW (ref 11.5–15.5)
IMM GRANULOCYTES NFR BLD AUTO: 0.5 % — SIGNIFICANT CHANGE UP (ref 0–0.9)
LYMPHOCYTES # BLD AUTO: 2.62 K/UL — SIGNIFICANT CHANGE UP (ref 1–3.3)
LYMPHOCYTES # BLD AUTO: 21.2 % — SIGNIFICANT CHANGE UP (ref 13–44)
MCHC RBC-ENTMCNC: 33.7 GM/DL — SIGNIFICANT CHANGE UP (ref 32–36)
MCHC RBC-ENTMCNC: 33.9 PG — SIGNIFICANT CHANGE UP (ref 27–34)
MCV RBC AUTO: 100.6 FL — HIGH (ref 80–100)
MONOCYTES # BLD AUTO: 0.73 K/UL — SIGNIFICANT CHANGE UP (ref 0–0.9)
MONOCYTES NFR BLD AUTO: 5.9 % — SIGNIFICANT CHANGE UP (ref 2–14)
NEUTROPHILS # BLD AUTO: 8.54 K/UL — HIGH (ref 1.8–7.4)
NEUTROPHILS NFR BLD AUTO: 69.1 % — SIGNIFICANT CHANGE UP (ref 43–77)
NRBC # BLD: 0 /100 WBCS — SIGNIFICANT CHANGE UP (ref 0–0)
PLATELET # BLD AUTO: 244 K/UL — SIGNIFICANT CHANGE UP (ref 150–400)
RBC # BLD: 3.36 M/UL — LOW (ref 3.8–5.2)
RBC # FLD: 13.4 % — SIGNIFICANT CHANGE UP (ref 10.3–14.5)
RH IG SCN BLD-IMP: POSITIVE — SIGNIFICANT CHANGE UP
WBC # BLD: 12.35 K/UL — HIGH (ref 3.8–10.5)
WBC # FLD AUTO: 12.35 K/UL — HIGH (ref 3.8–10.5)

## 2024-10-18 PROCEDURE — 86780 TREPONEMA PALLIDUM: CPT

## 2024-10-18 PROCEDURE — 85027 COMPLETE CBC AUTOMATED: CPT

## 2024-10-18 PROCEDURE — 36415 COLL VENOUS BLD VENIPUNCTURE: CPT

## 2024-10-18 PROCEDURE — 86900 BLOOD TYPING SEROLOGIC ABO: CPT

## 2024-10-18 PROCEDURE — 86850 RBC ANTIBODY SCREEN: CPT

## 2024-10-18 PROCEDURE — 85025 COMPLETE CBC W/AUTO DIFF WBC: CPT

## 2024-10-18 PROCEDURE — 85384 FIBRINOGEN ACTIVITY: CPT

## 2024-10-18 PROCEDURE — 86901 BLOOD TYPING SEROLOGIC RH(D): CPT

## 2024-10-18 PROCEDURE — 85610 PROTHROMBIN TIME: CPT

## 2024-10-18 PROCEDURE — 84550 ASSAY OF BLOOD/URIC ACID: CPT

## 2024-10-18 PROCEDURE — 83615 LACTATE (LD) (LDH) ENZYME: CPT

## 2024-10-18 PROCEDURE — 59050 FETAL MONITOR W/REPORT: CPT

## 2024-10-18 PROCEDURE — 85730 THROMBOPLASTIN TIME PARTIAL: CPT

## 2024-10-18 PROCEDURE — 80053 COMPREHEN METABOLIC PANEL: CPT

## 2024-10-18 PROCEDURE — 84112 EVAL AMNIOTIC FLUID PROTEIN: CPT

## 2024-10-18 RX ORDER — PRENATAL VIT,CAL 76/IRON/FOLIC 29 MG-1 MG
1 TABLET ORAL
Qty: 0 | Refills: 0 | DISCHARGE
Start: 2024-10-18

## 2024-10-18 RX ORDER — ACETAMINOPHEN 325 MG
3 TABLET ORAL
Qty: 0 | Refills: 0 | DISCHARGE
Start: 2024-10-18

## 2024-10-18 RX ADMIN — Medication 1 TABLET(S): at 12:24

## 2024-10-18 RX ADMIN — Medication 17 GRAM(S): at 12:24

## 2024-10-18 RX ADMIN — Medication 975 MILLIGRAM(S): at 09:21

## 2024-10-18 RX ADMIN — Medication 975 MILLIGRAM(S): at 15:36

## 2024-10-18 RX ADMIN — Medication 975 MILLIGRAM(S): at 02:45

## 2024-10-18 RX ADMIN — Medication 600 MILLIGRAM(S): at 00:23

## 2024-10-18 RX ADMIN — Medication 600 MILLIGRAM(S): at 00:12

## 2024-10-18 RX ADMIN — Medication 600 MILLIGRAM(S): at 06:40

## 2024-10-18 RX ADMIN — Medication 975 MILLIGRAM(S): at 02:46

## 2024-10-18 RX ADMIN — Medication 600 MILLIGRAM(S): at 12:24

## 2024-10-18 RX ADMIN — Medication 600 MILLIGRAM(S): at 06:42

## 2024-10-18 NOTE — DISCHARGE NOTE OB - PATIENT PORTAL LINK FT
You can access the FollowMyHealth Patient Portal offered by [facility] by registering at the following website: [email or web address]. By joining [employer]’s FollowMyHealth portal, you will also be able to view your health information using other applications (apps) compatible with our system.

## 2024-10-18 NOTE — DISCHARGE NOTE OB - HOSPITAL COURSE
Patient has uncomplicated  after induction of labor for premature rupture of membranes. Patient presenting with 1 episode of hematemsis on admission. GI was consulted and recommended omeprazole 40mg qD, no need for endoscopic evaluation. The patient had no recurrent episodes of hematemesis. Postpartum course was unremarkable and she remained hemodynamically stable and afebrile throughout. Upon discharge on PPD2, the patient is ambulating and voiding spontaneously, tolerating oral intake, pain was well controlled with oral medication, and vital signs were stable. Patient stable for discharge.

## 2024-10-18 NOTE — DISCHARGE NOTE OB - MEDICATION SUMMARY - MEDICATIONS TO TAKE
I will START or STAY ON the medications listed below when I get home from the hospital:    ibuprofen 600 mg oral tablet  -- 1 tab(s) by mouth every 6 hours  -- Indication: For Pain    acetaminophen 325 mg oral tablet  -- 3 tab(s) by mouth every 6 hours  -- Indication: For Pain    Prenatal Multivitamins with Folic Acid 1 mg oral tablet  -- 1 tab(s) by mouth once a day  -- Indication: For Postpartum state    omeprazole 40 mg oral delayed release capsule  -- 1 cap(s) by mouth once a day  -- Indication: For GI

## 2024-10-18 NOTE — PROGRESS NOTE ADULT - SUBJECTIVE AND OBJECTIVE BOX
Patient evaluated at bedside.   She reports pain is well controlled with OPM  She denies headache, dizziness, chest pain, palpitations, shortness of breath, nausea, vomiting, heavy vaginal bleeding or perineal discomfort.  She has been ambulating without assistance, voiding spontaneously, and is breastfeeding.    Physical Exam:  T(C): 36.7 (10-17-24 @ 06:01), Max: 36.8 (10-16-24 @ 21:57)  HR: 67 (10-17-24 @ 06:01) (65 - 67)  BP: 96/54 (10-17-24 @ 06:01) (96/54 - 111/69)  RR: 18 (10-17-24 @ 06:01) (18 - 18)  SpO2: 99% (10-17-24 @ 06:01) (97% - 99%)    GA: NAD, A+0 x 3  Breasts: soft, nontender, no palpable masses  Abd: + BS, soft, nontender, nondistended, no rebound or guarding, uterus firm at midline, 2  fb below umbilicus  : lochia WNL  Extremities: no calf tenderness                            10.2   15.40 )-----------( 221      ( 17 Oct 2024 05:30 )             30.2     10-15    137  |  104  |  8   ----------------------------<  55[L]  3.7   |  21[L]  |  0.71    Ca    9.2      15 Oct 2024 16:35    TPro  6.8  /  Alb  3.9  /  TBili  0.6  /  DBili  x   /  AST  24  /  ALT  18  /  AlkPhos  206[H]  10-15    A/P 39y s/p , PPD # 1 ,stable  1. Pain: well controlled on OPM  2. GI: Regular diet  3. : s.p santa  4. DVT prophylaxis: SCDs, ambulate  5. Dispo: PPD 2, unless otherwise specified  
Patient evaluated at bedside this morning, resting comfortable in bed, no acute events overnight.  She reports pain is well controlled with tylenol and motrin.  She denies headache, dizziness, chest pain, palpitations, shortness of breath, nausea, vomiting, heavy vaginal bleeding or perineal discomfort. Reports decrease in amount of vaginal bleeding and denies clots.  Denies hematemesis.  She has been ambulating without assistance, voiding spontaneously.  Tolerating food well, without nausea/vomit.      Physical Exam:  T(C): 36.7 (10-17-24 @ 06:01), Max: 36.9 (10-16-24 @ 18:31)  HR: 67 (10-17-24 @ 06:01) (59 - 67)  BP: 96/54 (10-17-24 @ 06:01) (96/54 - 111/69)  RR: 18 (10-17-24 @ 06:01) (18 - 18)  SpO2: 99% (10-17-24 @ 06:01) (97% - 99%)    GA: NAD, comfortable, conversational  Abd: soft, nontender, nondistended, no rebound or guarding, uterus firm.  Extremities: no swelling or calf tenderness  Perineum: lochia wnl                          10.8   18.15 )-----------( 213      ( 16 Oct 2024 11:50 )             31.4     10-15    137  |  104  |  8   ----------------------------<  55[L]  3.7   |  21[L]  |  0.71    Ca    9.2      15 Oct 2024 16:35    TPro  6.8  /  Alb  3.9  /  TBili  0.6  /  DBili  x   /  AST  24  /  ALT  18  /  AlkPhos  206[H]  10-15    acetaminophen     Tablet .. 975 milliGRAM(s) Oral <User Schedule>  benzocaine 20%/menthol 0.5% Spray 1 Spray(s) Topical every 6 hours PRN  dibucaine 1% Ointment 1 Application(s) Topical every 6 hours PRN  diphenhydrAMINE 25 milliGRAM(s) Oral every 6 hours PRN  diphtheria/tetanus/pertussis (acellular) Vaccine (Adacel) 0.5 milliLiter(s) IntraMuscular once  fentanyl (2 MICROgram(s)/mL) + bupivacaine 0.0625%  in 0.9% Sodium Chloride PCEA 250 milliLiter(s) Epidural PCA Continuous  hydrocortisone 1% Cream 1 Application(s) Topical every 6 hours PRN  ibuprofen  Tablet. 600 milliGRAM(s) Oral every 6 hours  lanolin Ointment 1 Application(s) Topical every 6 hours PRN  magnesium hydroxide Suspension 30 milliLiter(s) Oral two times a day PRN  oxyCODONE    IR 5 milliGRAM(s) Oral every 3 hours PRN  oxyCODONE    IR 5 milliGRAM(s) Oral once PRN  oxytocin Infusion 167 milliUNIT(s)/Min IV Continuous <Continuous>  pantoprazole  Injectable 40 milliGRAM(s) IV Push daily  polyethylene glycol 3350 17 Gram(s) Oral daily  pramoxine 1%/zinc 5% Cream 1 Application(s) Topical every 4 hours PRN  prenatal multivitamin 1 Tablet(s) Oral daily  simethicone 80 milliGRAM(s) Chew every 4 hours PRN  sodium chloride 0.9% lock flush 3 milliLiter(s) IV Push every 8 hours  witch hazel Pads 1 Application(s) Topical every 4 hours PRN  
Patient evaluated at bedside this morning, resting comfortable in bed, no acute events overnight.  She reports pain is well controlled with tylenol and motrin.  Denies hematemesis.  She denies headache, dizziness, chest pain, palpitations, shortness of breath, nausea, vomiting, heavy vaginal bleeding or perineal discomfort. Reports decrease in amount of vaginal bleeding and denies clots.  She has been ambulating without assistance, voiding spontaneously.  Tolerating food well, without nausea/vomit.      Physical Exam:  T(C): 36.7 (10-18-24 @ 06:00), Max: 36.9 (10-17-24 @ 18:59)  HR: 63 (10-18-24 @ 06:00) (52 - 63)  BP: 121/76 (10-18-24 @ 06:00) (110/60 - 121/76)  RR: 18 (10-18-24 @ 06:00) (17 - 18)  SpO2: 97% (10-18-24 @ 06:00) (96% - 97%)    GA: NAD, comfortable, conversational  Abd: soft, nontender, nondistended, no rebound or guarding, uterus firm.  Extremities: no swelling or calf tenderness  Perineum: lochia wnl                          11.4   12.35 )-----------( 244      ( 18 Oct 2024 05:30 )             33.8           acetaminophen     Tablet .. 975 milliGRAM(s) Oral <User Schedule>  benzocaine 20%/menthol 0.5% Spray 1 Spray(s) Topical every 6 hours PRN  dibucaine 1% Ointment 1 Application(s) Topical every 6 hours PRN  diphenhydrAMINE 25 milliGRAM(s) Oral every 6 hours PRN  diphtheria/tetanus/pertussis (acellular) Vaccine (Adacel) 0.5 milliLiter(s) IntraMuscular once  fentanyl (2 MICROgram(s)/mL) + bupivacaine 0.0625%  in 0.9% Sodium Chloride PCEA 250 milliLiter(s) Epidural PCA Continuous  hydrocortisone 1% Cream 1 Application(s) Topical every 6 hours PRN  ibuprofen  Tablet. 600 milliGRAM(s) Oral every 6 hours  lanolin Ointment 1 Application(s) Topical every 6 hours PRN  magnesium hydroxide Suspension 30 milliLiter(s) Oral two times a day PRN  oxyCODONE    IR 5 milliGRAM(s) Oral every 3 hours PRN  oxyCODONE    IR 5 milliGRAM(s) Oral once PRN  oxytocin Infusion 167 milliUNIT(s)/Min IV Continuous <Continuous>  pantoprazole  Injectable 40 milliGRAM(s) IV Push daily  polyethylene glycol 3350 17 Gram(s) Oral daily  pramoxine 1%/zinc 5% Cream 1 Application(s) Topical every 4 hours PRN  prenatal multivitamin 1 Tablet(s) Oral daily  simethicone 80 milliGRAM(s) Chew every 4 hours PRN  sodium chloride 0.9% lock flush 3 milliLiter(s) IV Push every 8 hours  witch hazel Pads 1 Application(s) Topical every 4 hours PRN  
Patient evaluated at bedside. Doing well since delivery earlier this morning. Pain well controlled. Tolerating po, ambulating, voiding. Denies further nausea/vomiting since episode of hematemesis yesterday during labor. Also denies chest pain, sob, fevers/chills. Breastfeeding, seeing lactation consultant this morning.

## 2024-10-18 NOTE — PROGRESS NOTE ADULT - ASSESSMENT
A/P 39y s/p , PPD#2, stable, meeting postpartum milestones   - Pain: well controlled on tylenol/motrin  - GI: Tolerating regular diet. On omeprazole 40mg qD x2 weeks. F/u AM CBC  - : urinating without difficulty/pain  - DVT prophylaxis: ambulating frequently  - Dispo: PPD 2, unless otherwise specified     A/P 39y s/p , PPD#2, stable, meeting postpartum milestones   - Pain: well controlled on tylenol/motrin  - GI: Tolerating regular diet. On omeprazole 40mg qD x2 weeks. F/u AM CBC  - : urinating without difficulty/pain  - DVT prophylaxis: ambulating frequently  - Dispo: PPD 2, unless otherwise specified      attending note:  patient seen and examined  vssaf  doing well  meeting milestones  dc planning  -dr melinda kahn

## 2024-10-18 NOTE — DISCHARGE NOTE OB - FINANCIAL ASSISTANCE
[facility] provides services at a reduced cost to those who are determined to be eligible through [facility]’s financial assistance program. Information regarding [facility]’s financial assistance program can be found by going to [email or web address] or by calling [phone].

## 2024-10-18 NOTE — DISCHARGE NOTE OB - CARE PROVIDER_API CALL
James Bee  Obstetrics and Gynecology  [address] [place] [id number]  Phone: [phone]  Fax: [phone]  Follow Up Time:

## 2024-10-18 NOTE — DISCHARGE NOTE OB - PLAN OF CARE
Present on admission, resolved Follow up in the office with your doctor in six weeks. Please call to confirm your appointment. You can eat a regular diet. Please take 600mg Mortin every 6 hours and/or Tylenol 975mg every six hours as needed for pain. No heavy lifting and nothing in the vagina until cleared by your doctor. Please call your doctor with any questions or concerns.

## 2024-10-18 NOTE — DISCHARGE NOTE OB - CARE PLAN
1 Principal Discharge DX:	Postpartum state  Assessment and plan of treatment:	Follow up in the office with your doctor in six weeks. Please call to confirm your appointment. You can eat a regular diet. Please take 600mg Mortin every 6 hours and/or Tylenol 975mg every six hours as needed for pain. No heavy lifting and nothing in the vagina until cleared by your doctor. Please call your doctor with any questions or concerns.  Secondary Diagnosis:	Hematemesis  Assessment and plan of treatment:	Present on admission, resolved

## 2024-10-24 ENCOUNTER — TRANSCRIPTION ENCOUNTER (OUTPATIENT)
Age: 39
End: 2024-10-24

## 2024-10-24 DIAGNOSIS — Z88.2 ALLERGY STATUS TO SULFONAMIDES: ICD-10-CM

## 2024-10-24 DIAGNOSIS — O42.92 FULL-TERM PREMATURE RUPTURE OF MEMBRANES, UNSPECIFIED AS TO LENGTH OF TIME BETWEEN RUPTURE AND ONSET OF LABOR: ICD-10-CM

## 2024-10-24 DIAGNOSIS — K92.0 HEMATEMESIS: ICD-10-CM

## 2024-10-24 DIAGNOSIS — Z88.0 ALLERGY STATUS TO PENICILLIN: ICD-10-CM

## 2024-10-24 DIAGNOSIS — Z28.09 IMMUNIZATION NOT CARRIED OUT BECAUSE OF OTHER CONTRAINDICATION: ICD-10-CM

## 2024-10-24 DIAGNOSIS — Z3A.39 39 WEEKS GESTATION OF PREGNANCY: ICD-10-CM
